# Patient Record
Sex: FEMALE | Race: WHITE | URBAN - METROPOLITAN AREA
[De-identification: names, ages, dates, MRNs, and addresses within clinical notes are randomized per-mention and may not be internally consistent; named-entity substitution may affect disease eponyms.]

---

## 2017-01-13 DIAGNOSIS — E11.65 TYPE 2 DIABETES MELLITUS WITH HYPERGLYCEMIA, WITH LONG-TERM CURRENT USE OF INSULIN (H): Primary | Chronic | ICD-10-CM

## 2017-01-13 DIAGNOSIS — Z79.4 TYPE 2 DIABETES MELLITUS WITH HYPERGLYCEMIA, WITH LONG-TERM CURRENT USE OF INSULIN (H): Primary | Chronic | ICD-10-CM

## 2017-01-16 RX ORDER — CANAGLIFLOZIN 100 MG/1
TABLET, FILM COATED ORAL
Qty: 30 TABLET | Refills: 0 | Status: SHIPPED | OUTPATIENT
Start: 2017-01-16 | End: 2017-02-20

## 2017-01-16 NOTE — TELEPHONE ENCOUNTER
INVOKANA 100 MG tablet         Last Written Prescription Date: 12/12/2016  Last Fill Quantity: 30, # refills: 0  Last Office Visit with [facility], P or [facility] prescribing provider:  12/27/2016        BP Readings from Last 3 Encounters:   12/27/16 116/76   10/22/16 128/73   10/10/16 126/80     MICROL       22   12/27/2016  No results found for this basename: microalbumin  CREATININE   Date Value Ref Range Status   12/27/2016 0.60 0.52 - 1.04 mg/dL Final   ]  GFR ESTIMATE   Date Value Ref Range Status   12/27/2016 >90  Non  GFR Calc   >60 mL/min/1.7m2 Final   09/09/2016 >90  Non  GFR Calc   >60 mL/min/1.7m2 Final   02/17/2016 >90  Non  GFR Calc   >60 mL/min/1.7m2 Final     GFR ESTIMATE IF BLACK   Date Value Ref Range Status   12/27/2016 >90   GFR Calc   >60 mL/min/1.7m2 Final   09/09/2016 >90   GFR Calc   >60 mL/min/1.7m2 Final   02/17/2016 >90   GFR Calc   >60 mL/min/1.7m2 Final     CHOL      152   3/23/2016  HDL       69   3/23/2016  LDL       51   3/23/2016  LDL       72   10/10/2014  TRIG      159   3/23/2016  CHOLHDLRATIO      3.7   4/13/2015  AST        8   12/27/2016  ALT       31   12/27/2016  A1C      7.2   10/22/2016  A1C      7.5   9/9/2016  A1C      7.6   3/23/2016  A1C      8.3   2/22/2016  A1C      8.7   1/22/2016  POTASSIUM   Date Value Ref Range Status   12/27/2016 4.4 3.4 - 5.3 mmol/L Final

## 2017-01-16 NOTE — TELEPHONE ENCOUNTER
Prescription approved per [facility] Refill Protocol.  Haider Camacho RN

## 2017-02-16 DIAGNOSIS — Z79.4 TYPE 2 DIABETES MELLITUS WITH HYPERGLYCEMIA, WITH LONG-TERM CURRENT USE OF INSULIN (H): Chronic | ICD-10-CM

## 2017-02-16 DIAGNOSIS — E11.65 TYPE 2 DIABETES MELLITUS WITH HYPERGLYCEMIA, WITH LONG-TERM CURRENT USE OF INSULIN (H): Chronic | ICD-10-CM

## 2017-02-16 RX ORDER — GLIPIZIDE 10 MG/1
TABLET ORAL
Qty: 180 TABLET | Refills: 0 | Status: CANCELLED | OUTPATIENT
Start: 2017-02-16

## 2017-02-17 NOTE — TELEPHONE ENCOUNTER
Glipizide          Last Written Prescription Date: 11/01/2016  Last Fill Quantity: 180, # refills: 0  Last Office Visit with G, P or [facility] prescribing provider:  12/27/2016        BP Readings from Last 3 Encounters:   12/27/16 116/76   10/22/16 128/73   10/10/16 126/80     Lab Results   Component Value Date    MICROL 22 12/27/2016     No results found for: MICROALBUMIN  Creatinine   Date Value Ref Range Status   12/27/2016 0.60 0.52 - 1.04 mg/dL Final   ]  GFR Estimate   Date Value Ref Range Status   12/27/2016 >90  Non  GFR Calc   >60 mL/min/1.7m2 Final   09/09/2016 >90  Non  GFR Calc   >60 mL/min/1.7m2 Final   02/17/2016 >90  Non  GFR Calc   >60 mL/min/1.7m2 Final     GFR Estimate If Black   Date Value Ref Range Status   12/27/2016 >90   GFR Calc   >60 mL/min/1.7m2 Final   09/09/2016 >90   GFR Calc   >60 mL/min/1.7m2 Final   02/17/2016 >90   GFR Calc   >60 mL/min/1.7m2 Final     Lab Results   Component Value Date    CHOL 152 03/23/2016     Lab Results   Component Value Date    HDL 69 03/23/2016     Lab Results   Component Value Date    LDL 51 03/23/2016    LDL 72 10/10/2014     Lab Results   Component Value Date    TRIG 159 03/23/2016     Lab Results   Component Value Date    CHOLHDLRATIO 3.7 04/13/2015     Lab Results   Component Value Date    AST 8 12/27/2016     Lab Results   Component Value Date    ALT 31 12/27/2016     Lab Results   Component Value Date    A1C 7.2 10/22/2016    A1C 7.5 09/09/2016    A1C 7.6 03/23/2016    A1C 8.3 02/22/2016    A1C 8.7 01/22/2016     Potassium   Date Value Ref Range Status   12/27/2016 4.4 3.4 - 5.3 mmol/L Final

## 2017-02-20 ENCOUNTER — OFFICE VISIT (OUTPATIENT)
Dept: FAMILY MEDICINE | Facility: CLINIC | Age: 52
End: 2017-02-20
Payer: MEDICARE

## 2017-02-20 VITALS
TEMPERATURE: 98.4 F | DIASTOLIC BLOOD PRESSURE: 68 MMHG | BODY MASS INDEX: 29.39 KG/M2 | SYSTOLIC BLOOD PRESSURE: 110 MMHG | HEART RATE: 93 BPM | OXYGEN SATURATION: 98 % | WEIGHT: 176.4 LBS | HEIGHT: 65 IN

## 2017-02-20 DIAGNOSIS — E11.65 TYPE 2 DIABETES MELLITUS WITH HYPERGLYCEMIA, WITH LONG-TERM CURRENT USE OF INSULIN (H): Primary | Chronic | ICD-10-CM

## 2017-02-20 DIAGNOSIS — I10 ESSENTIAL HYPERTENSION, MALIGNANT: ICD-10-CM

## 2017-02-20 DIAGNOSIS — Z23 NEED FOR PROPHYLACTIC VACCINATION AND INOCULATION AGAINST INFLUENZA: ICD-10-CM

## 2017-02-20 DIAGNOSIS — Z79.4 TYPE 2 DIABETES MELLITUS WITH HYPERGLYCEMIA, WITH LONG-TERM CURRENT USE OF INSULIN (H): Primary | Chronic | ICD-10-CM

## 2017-02-20 DIAGNOSIS — Z13.89 SCREENING FOR DIABETIC PERIPHERAL NEUROPATHY: ICD-10-CM

## 2017-02-20 LAB — HBA1C MFR BLD: 7.9 % (ref 4.3–6)

## 2017-02-20 PROCEDURE — 36415 COLL VENOUS BLD VENIPUNCTURE: CPT | Performed by: FAMILY MEDICINE

## 2017-02-20 PROCEDURE — 83036 HEMOGLOBIN GLYCOSYLATED A1C: CPT | Performed by: FAMILY MEDICINE

## 2017-02-20 PROCEDURE — 99207 C FOOT EXAM  NO CHARGE: CPT | Performed by: FAMILY MEDICINE

## 2017-02-20 PROCEDURE — 99213 OFFICE O/P EST LOW 20 MIN: CPT | Performed by: FAMILY MEDICINE

## 2017-02-20 RX ORDER — LISINOPRIL 2.5 MG/1
2.5 TABLET ORAL DAILY
Qty: 90 TABLET | Refills: 2 | Status: SHIPPED | OUTPATIENT
Start: 2017-02-20 | End: 2017-03-28

## 2017-02-20 RX ORDER — GLIPIZIDE 10 MG/1
TABLET ORAL
Qty: 180 TABLET | Refills: 0 | Status: SHIPPED | OUTPATIENT
Start: 2017-02-20 | End: 2017-03-28

## 2017-02-20 ASSESSMENT — PAIN SCALES - GENERAL: PAINLEVEL: MILD PAIN (2)

## 2017-02-20 NOTE — NURSING NOTE
"Chief Complaint   Patient presents with     Diabetes       Initial /68  Pulse 93  Temp 98.4  F (36.9  C) (Oral)  Ht 5' 5.3\" (1.659 m)  Wt 176 lb 6.4 oz (80 kg)  SpO2 98%  BMI 29.09 kg/m2 Estimated body mass index is 29.09 kg/(m^2) as calculated from the following:    Height as of this encounter: 5' 5.3\" (1.659 m).    Weight as of this encounter: 176 lb 6.4 oz (80 kg).  Medication Reconciliation: complete     Nimco Brand, BARON   "

## 2017-02-20 NOTE — PROGRESS NOTES
"  SUBJECTIVE:                                                    Kimberly Bills is a 52 year old female who presents to clinic today for the following health issues:    Diabetes Follow-up    Patient is checking blood sugars: twice daily.    Blood sugar testing frequency justification: Patient modifying lifestyle changes (diet, exercise) with blood sugars  Results are as follows:         158-94    Diabetic concerns: None     Symptoms of hypoglycemia (low blood sugar): shaky, blurred vision     Paresthesias (numbness or burning in feet) or sores: No     Date of last diabetic eye exam: 1/2017     Taking insulin: 38 Lantus and 4 units of Novolog with meals.  Also taking Januvia 100 mg and Glipizide 10 mg twice daily   Been missing some injections a lot of times.  Has some dryness and no frequency.    Weight   Working out; walking more.  Wt Readings from Last 4 Encounters:   02/20/17 176 lb 6.4 oz (80 kg)   12/27/16 175 lb (79.4 kg)   10/22/16 190 lb 6.4 oz (86.4 kg)   10/10/16 191 lb 6.4 oz (86.8 kg)         Amount of exercise or physical activity: physically active at work    Problems taking medications regularly: No    Medication side effects: none  Diet: diabetic and carbohydrate counting    Problem list and histories reviewed & adjusted, as indicated.  Additional history: as documented    Problem list, Medication list, Allergies, and Medical/Social/Surgical histories [facility] in [facility] and updated as appropriate.    ROS:  Constitutional, HEENT, cardiovascular, pulmonary, gi and gu systems are negative, except as otherwise noted.    OBJECTIVE:                                                    /68  Pulse 93  Temp 98.4  F (36.9  C) (Oral)  Ht 5' 5.3\" (1.659 m)  Wt 176 lb 6.4 oz (80 kg)  SpO2 98%  BMI 29.09 kg/m2  Body mass index is 29.09 kg/(m^2).  GENERAL: healthy, alert and no distress  NECK: no adenopathy and thyroid normal to palpation  RESP: lungs clear to auscultation - no rales, rhonchi or wheezes  CV: " regular rate and rhythm, no murmur, click or rub, no peripheral edema   ABDOMEN: soft, nontender, no masses and bowel sounds normal  MS: no gross musculoskeletal defects noted, no edema    Diagnostic Test Results:     ASSESSMENT/PLAN:                                                    (E11.65,  Z79.4) Type 2 diabetes mellitus with hyperglycemia, with long-term current use of insulin (H)  (primary encounter diagnosis)  Comment: A1c at goal but trending up. She has been non compliant with insulin. Has lost some weight. Discussed regular use of medications.  Plan: Hemoglobin A1c, glipiZIDE (GLUCOTROL) 10 MG         tablet, canagliflozin (INVOKANA) 100 MG tablet         lisinopril (PRINIVIL/ZESTRIL) 2.5 MG tablet    (Z13.89) Screening for diabetic peripheral neuropathy  Comment: Normal foot exam. Foot care discussed.  Plan: FOOT EXAM  NO CHARGE [56447.114]         Follow up in 3 months or sooner with concerns    Jeb Ruiz MD  [facility]

## 2017-02-20 NOTE — PATIENT INSTRUCTIONS
[facility]    If you have any questions regarding to your visit please contact your care team:       Team Purple:   Clinic Hours Telephone Number   [address] Dr[address]   7am-7pm  Monday - Thursday   7am-5pm  Fridays  [phone]  (Appointment scheduling available 24/7)    Questions about your Visit?   Team Line:  [phone]   Urgent Care - [place] and [facility] - 11am-9pm Monday-Friday Saturday-Sunday- 9am-5pm   [place] - 5pm-9pm Monday-Friday Saturday-Sunday- 9am-5pm  [phone] - [facility]  [phone] - [place]       What options do I have for visits at the clinic other than the traditional office visit?  To expand how we care for you, many of our providers are utilizing electronic visits (e-visits) and telephone visits, when medically appropriate, for interactions with their patients rather than a visit in the clinic.   We also offer nurse visits for many medical concerns. Just like any other service, we will bill your insurance company for this type of visit based on time spent on the phone with your provider. Not all insurance companies cover these visits. Please check with your medical insurance if this type of visit is covered. You will be responsible for any charges that are not paid by your insurance.      E-visits via [employer]Socialt:  generally incur a $35.00 fee.  Telephone visits:  Time spent on the phone: *charged based on time that is spent on the phone in increments of 10 minutes. Estimated cost:   5-10 mins $30.00   11-20 mins. $59.00   21-30 mins. $85.00     Use [email or web address]Socialt (secure email communication and access to your chart) to send your primary care provider a message or make an appointment. Ask someone on your Team how to sign up for [employer].  For a Price Quote for your services, please call our Consumer Price Line at [phone].  As always, Thank you for trusting us with your health care  needs!    Discharged By: An

## 2017-02-20 NOTE — MR AVS SNAPSHOT
After Visit Summary   2/20/2017    Kimberly Bills    MRN: [id number]           Patient Information     Date Of Birth          1965        Visit Information        Provider Department      2/20/2017 3:00 PM Jeb Ruiz MD [facility]        Today's Diagnoses     Type 2 diabetes mellitus with hyperglycemia, with long-term current use of insulin (H)    -  1    Screening for diabetic peripheral neuropathy        Need for prophylactic vaccination and inoculation against influenza        Essential hypertension, malignant          Care Instructions    [facility]    If you have any questions regarding to your visit please contact your care team:       Team Purple:   Clinic Hours Telephone Number   [address] Dr.   7am-7pm  Monday - Thursday   7am-5pm  Fridays  [phone]  (Appointment scheduling available 24/7)    Questions about your Visit?   Team Line:  [phone]   Urgent Care - [place] and [facility] - 11am-9pm Monday-Friday Saturday-Sunday- 9am-5pm   [place] - 5pm-9pm Monday-Friday Saturday-Sunday- 9am-5pm  [phone] - [place]   [phone] - [place]       What options do I have for visits at the clinic other than the traditional office visit?  To expand how we care for you, many of our providers are utilizing electronic visits (e-visits) and telephone visits, when medically appropriate, for interactions with their patients rather than a visit in the clinic.   We also offer nurse visits for many medical concerns. Just like any other service, we will bill your insurance company for this type of visit based on time spent on the phone with your provider. Not all insurance companies cover these visits. Please check with your medical insurance if this type of visit is covered. You will be responsible for any charges that are not paid by your insurance.      E-visits via  [employer]:  generally incur a $35.00 fee.  Telephone visits:  Time spent on the phone: *charged based on time that is spent on the phone in increments of 10 minutes. Estimated cost:   5-10 mins $30.00   11-20 mins. $59.00   21-30 mins. $85.00     Use [employer] (secure email communication and access to your chart) to send your primary care provider a message or make an appointment. Ask someone on your Team how to sign up for [employer].  For a Price Quote for your services, please call our Conmio Line at [phone].  As always, Thank you for trusting us with your health care needs!    Discharged By: An          Follow-ups after your visit        Follow-up notes from your care team     Return in about 3 months (around 5/20/2017).      Who to contact     If you have questions or need follow up information about today's clinic visit or your schedule please contact [facility] directly at [phone].  Normal or non-critical lab and imaging results will be communicated to you by MyChart, letter or phone within 4 business days after the clinic has received the results. If you do not hear from us within 7 days, please contact the clinic through [employer] Engine[employer] or phone. If you have a critical or abnormal lab result, we will notify you by phone as soon as possible.  Submit refill requests through [employer] or call your pharmacy and they will forward the refill request to us. Please allow 3 business days for your refill to be completed.          Additional Information About Your Visit        Tour [employer] Information     [employer] gives you secure access to your electronic health record. If you see a primary care provider, you can also send messages to your care team and make appointments. If you have questions, please call your primary care clinic.  If you do not have a primary care provider, please call [phone] and they will assist you.        Care EveryWhere ID     This is your Care EveryWhere ID. This could be used  "by other organizations to access your [facility] medical records  [id number]        Your Vitals Were     Pulse Temperature Height Pulse Oximetry BMI (Body Mass Index)       93 98.4  F (36.9  C) (Oral) 5' 5.3\" (1.659 m) 98% 29.09 kg/m2        Blood Pressure from Last 3 Encounters:   02/20/17 110/68   12/27/16 116/76   10/22/16 128/73    Weight from Last 3 Encounters:   02/20/17 176 lb 6.4 oz (80 kg)   12/27/16 175 lb (79.4 kg)   10/22/16 190 lb 6.4 oz (86.4 kg)              We Performed the Following     FOOT EXAM  NO CHARGE [01767.114]     Hemoglobin A1c          Today's Medication Changes          These changes are accurate as of: 2/20/17  3:50 PM.  If you have any questions, ask your nurse or doctor.               These medicines have changed or have updated prescriptions.        Dose/Directions    canagliflozin 100 MG tablet   Commonly known as:  INVOKANA   This may have changed:  See the new instructions.   Used for:  Type 2 diabetes mellitus with hyperglycemia, with long-term current use of insulin (H)   Changed by:  Jeb Ruiz MD        Dose:  100 mg   Take 1 tablet (100 mg) by mouth every morning (before breakfast)   Quantity:  90 tablet   Refills:  1       fluticasone-salmeterol 45-21 MCG/ACT inhaler   Commonly known as:  ADVAIR-HFA   This may have changed:  additional instructions   Used for:  Wheezing        Dose:  2 puff   Inhale 2 puffs into the lungs 2 times daily   Quantity:  12 g   Refills:  1       glipiZIDE 10 MG tablet   Commonly known as:  GLUCOTROL   This may have changed:  See the new instructions.   Used for:  Type 2 diabetes mellitus with hyperglycemia, with long-term current use of insulin (H)   Changed by:  Jeb Ruiz MD        TAKE 1 TABLET(10 MG) BY MOUTH TWICE DAILY BEFORE MEALS   Quantity:  180 tablet   Refills:  0            Where to get your medicines      These medications were sent to [facility] [address] [place] [address] NE AT SEC OF " [address]  [address] [place], [place] [address] [id number]    Hours:  Test fax successful 12/11/02   Phone:  [phone]     canagliflozin 100 MG tablet    glipiZIDE 10 MG tablet    lisinopril 2.5 MG tablet                Primary Care Provider Office Phone # Fax #    Jeb Ruiz -[phone]       [address]  [place] [address]        Thank you!     Thank you for choosing [facility]  for your care. Our goal is always to provide you with excellent care. Hearing back from our patients is one way we can continue to improve our services. Please take a few minutes to complete the written survey that you may receive in the mail after your visit with us. Thank you!             Your Updated Medication List - Protect others around you: Learn how to safely use, store and throw away your medicines at www.disposemymeds.org.          This list is accurate as of: 2/20/17  3:51 PM.  Always use your most recent med list.                   Brand Name Dispense Instructions for use    albuterol 108 (90 BASE) MCG/ACT Inhaler    PROAIR HFA/PROVENTIL HFA/VENTOLIN HFA    1 Inhaler    Inhale 2 puffs into the lungs every 6 hours as needed for shortness of breath / dyspnea or wheezing       ASPIRIN LOW DOSE 81 MG EC tablet   Generic drug:  aspirin     90 tablet    TAKE ONE TABLET BY MOUTH DAILY       blood glucose monitoring meter device kit     1 kit    Use to test blood sugars  daily as directed.       blood glucose monitoring test strip    no brand specified    3 Month    Test in the am       canagliflozin 100 MG tablet    INVOKANA    90 tablet    Take 1 tablet (100 mg) by mouth every morning (before breakfast)       cyclobenzaprine 10 MG tablet    FLEXERIL    90 tablet    TAKE 1 TABLET(10 MG) BY MOUTH TWICE DAILY AS NEEDED FOR MUSCLE SPASMS       DAILY-RIA Tabs     90 tablet    TAKE ONE TABLET BY MOUTH DAILY       diphenhydrAMINE 25 MG capsule    BENADRYL    30  capsule    Take 1 capsule (25 mg) by mouth every 6 hours as needed for itching or allergies       divalproex 500 MG EC tablet    DEPAKOTE     Take  by mouth. 3 tab daily       fluticasone-salmeterol 45-21 MCG/ACT inhaler    ADVAIR-HFA    12 g    Inhale 2 puffs into the lungs 2 times daily       glipiZIDE 10 MG tablet    GLUCOTROL    180 tablet    TAKE 1 TABLET(10 MG) BY MOUTH TWICE DAILY BEFORE MEALS       hydrocortisone 2.5 % ointment     60 g    Apply topically 2 times daily       insulin glargine 100 UNIT/ML injection    LANTUS SOLOSTAR    15 mL    INJECT 38 UNITS AT BEDTIME       insulin pen needle 31G X 5 MM    B-D U/F    100 each    USE ONE needle 4 times DAILY       Zenring FINEPOINT LANCETS Misc     100 each    Use to test blood sugars daily  as directed.       lisinopril 2.5 MG tablet    PRINIVIL/Zestril    90 tablet    Take 1 tablet (2.5 mg) by mouth daily       neomycin-polymyxin-hydrocortisone 3.5-71151-5 otic suspension    CORTISPORIN    10 mL    Place 4 drops in ear(s) 4 times daily       NovoLOG FLEXPEN 100 UNIT/ML injection   Generic drug:  insulin aspart     15 mL    INJECT SUBCUTANEOUS 4 UNITS BEFORE BREAKFAST, LUNCH AND DINNER       omeprazole 20 MG CR capsule    priLOSEC    90 capsule    TAKE ONE CAPSULE BY MOUTH DAILY 30 TO 60 MINUTES BEFORE A MEAL       STATIN NOT PRESCRIBED (INTENTIONAL)     0 each    continuous prn. Statin not prescribed intentionally due to Other:LDL at goal       venlafaxine 150 MG Tb24 24 hr tablet    EFFEXOR-ER     300 mg at HS along with 75 mg tab at HS (total 375 mg at HS)

## 2017-02-28 ENCOUNTER — TELEPHONE (OUTPATIENT)
Dept: FAMILY MEDICINE | Facility: CLINIC | Age: 52
End: 2017-02-28

## 2017-02-28 NOTE — TELEPHONE ENCOUNTER
Received a fax from new pharmacy called [facility]. They are requesting orders for lidocaine 5% ointment for the patient.  Patient is not taking this medications.  I spoke with patient and advised on the information as above. She states she does not use this pharmacy and is not requesting lidocaine ointment.   Please disregard this request and be advised that this patient is not using [place] pharmacy for her prescriptions.   Nimco Brand, CMA

## 2017-03-11 DIAGNOSIS — E11.65 TYPE 2 DIABETES MELLITUS WITH HYPERGLYCEMIA, WITH LONG-TERM CURRENT USE OF INSULIN (H): Chronic | ICD-10-CM

## 2017-03-11 DIAGNOSIS — Z79.4 TYPE 2 DIABETES MELLITUS WITH HYPERGLYCEMIA, WITH LONG-TERM CURRENT USE OF INSULIN (H): Chronic | ICD-10-CM

## 2017-03-13 NOTE — TELEPHONE ENCOUNTER
canagliflozin (INVOKANA) 100 MG tablet       Last Written Prescription Date: 2/20/17  Last Fill Quantity: 90, # refills: 1  Last Office Visit with G, P or [facility] prescribing provider:  2/20/17       BP Readings from Last 3 Encounters:   02/20/17 110/68   12/27/16 116/76   10/22/16 128/73     Lab Results   Component Value Date    MICROL 22 12/27/2016     No results found for: MICROALBUMIN  Creatinine   Date Value Ref Range Status   12/27/2016 0.60 0.52 - 1.04 mg/dL Final   ]  GFR Estimate   Date Value Ref Range Status   12/27/2016 >90  Non  GFR Calc   >60 mL/min/1.7m2 Final   09/09/2016 >90  Non  GFR Calc   >60 mL/min/1.7m2 Final   02/17/2016 >90  Non  GFR Calc   >60 mL/min/1.7m2 Final     GFR Estimate If Black   Date Value Ref Range Status   12/27/2016 >90   GFR Calc   >60 mL/min/1.7m2 Final   09/09/2016 >90   GFR Calc   >60 mL/min/1.7m2 Final   02/17/2016 >90   GFR Calc   >60 mL/min/1.7m2 Final     Lab Results   Component Value Date    CHOL 152 03/23/2016     Lab Results   Component Value Date    HDL 69 03/23/2016     Lab Results   Component Value Date    LDL 51 03/23/2016    LDL 72 10/10/2014     Lab Results   Component Value Date    TRIG 159 03/23/2016     Lab Results   Component Value Date    CHOLHDLRATIO 3.7 04/13/2015     Lab Results   Component Value Date    AST 8 12/27/2016     Lab Results   Component Value Date    ALT 31 12/27/2016     Lab Results   Component Value Date    A1C 7.9 02/20/2017    A1C 7.2 10/22/2016    A1C 7.5 09/09/2016    A1C 7.6 03/23/2016    A1C 8.3 02/22/2016     Potassium   Date Value Ref Range Status   12/27/2016 4.4 3.4 - 5.3 mmol/L Final

## 2017-03-14 RX ORDER — CANAGLIFLOZIN 100 MG/1
TABLET, FILM COATED ORAL
Qty: 30 TABLET | Refills: 0 | OUTPATIENT
Start: 2017-03-14

## 2017-03-14 NOTE — TELEPHONE ENCOUNTER
Invokana  Filled for 90 tabs with 1 refill 2/20/17, too soon.  Refill refused.    Christian Ackerman RN, BSN

## 2017-03-28 ENCOUNTER — OFFICE VISIT (OUTPATIENT)
Dept: FAMILY MEDICINE | Facility: CLINIC | Age: 52
End: 2017-03-28
Payer: MEDICARE

## 2017-03-28 VITALS
BODY MASS INDEX: 33.79 KG/M2 | HEART RATE: 101 BPM | HEIGHT: 61 IN | SYSTOLIC BLOOD PRESSURE: 118 MMHG | TEMPERATURE: 99.3 F | OXYGEN SATURATION: 97 % | WEIGHT: 179 LBS | DIASTOLIC BLOOD PRESSURE: 70 MMHG

## 2017-03-28 DIAGNOSIS — F17.200 NEEDS SMOKING CESSATION EDUCATION: ICD-10-CM

## 2017-03-28 DIAGNOSIS — Z79.4 TYPE 2 DIABETES MELLITUS WITH HYPERGLYCEMIA, WITH LONG-TERM CURRENT USE OF INSULIN (H): Primary | Chronic | ICD-10-CM

## 2017-03-28 DIAGNOSIS — E11.65 TYPE 2 DIABETES MELLITUS WITH HYPERGLYCEMIA, WITH LONG-TERM CURRENT USE OF INSULIN (H): Chronic | ICD-10-CM

## 2017-03-28 DIAGNOSIS — Z79.4 TYPE 2 DIABETES MELLITUS WITH HYPERGLYCEMIA, WITH LONG-TERM CURRENT USE OF INSULIN (H): Chronic | ICD-10-CM

## 2017-03-28 DIAGNOSIS — I10 ESSENTIAL HYPERTENSION, MALIGNANT: ICD-10-CM

## 2017-03-28 DIAGNOSIS — E11.65 TYPE 2 DIABETES MELLITUS WITH HYPERGLYCEMIA, WITH LONG-TERM CURRENT USE OF INSULIN (H): Primary | Chronic | ICD-10-CM

## 2017-03-28 PROCEDURE — 99214 OFFICE O/P EST MOD 30 MIN: CPT | Performed by: FAMILY MEDICINE

## 2017-03-28 RX ORDER — GLIPIZIDE 10 MG/1
TABLET ORAL
Qty: 60 TABLET | Refills: 5 | Status: SHIPPED | OUTPATIENT
Start: 2017-03-28 | End: 2017-03-28

## 2017-03-28 RX ORDER — LISINOPRIL 2.5 MG/1
2.5 TABLET ORAL DAILY
Qty: 30 TABLET | Refills: 5 | Status: SHIPPED | OUTPATIENT
Start: 2017-03-28

## 2017-03-28 ASSESSMENT — PAIN SCALES - GENERAL: PAINLEVEL: NO PAIN (0)

## 2017-03-28 NOTE — PROGRESS NOTES
SUBJECTIVE:                                                    Kimberly Bills is a 52 year old female who presents to clinic today for the following health issues:    Medication Followup of  ALL MEDS   Patient transferring care to [facility] and would like her medications refilled.      Taking Medication as prescribed: yes    Side Effects:  None    Medication Helping Symptoms:  yes     Diabetes Follow-up      Patient is checking blood sugars: rarely.  Results range from 120 to 150    Diabetic concerns: None     Symptoms of hypoglycemia (low blood sugar): none     Paresthesias (numbness or burning in feet) or sores: No     Date of last diabetic eye exam: within the last     Patient Active Problem List   Diagnosis     Borderline personality disorder     Bipolar disorder (H)     PTSD (post-traumatic stress disorder)     Chronic low back pain     GERD (gastroesophageal reflux disease)     Type 2 diabetes, HbA1C goal < 7%     Obesity     HDL deficiency     Sciatica     Subclinical hypothyroidism     Hyperlipidemia with target LDL less than 100     Hypertriglyceridemia     Cerebral seizure     Hypertension     Lumbago     Cervicalgia     Past Surgical History:   Procedure Laterality Date     APPENDECTOMY  1981     ENT SURGERY      tonsilectomy     HYSTERECTOMY, PAP NO LONGER INDICATED      for menorrhagia     ORTHOPEDIC SURGERY Left     carpal tunnel and trigger finger     RELEASE TRIGGER FINGER  5/2/2014    Procedure: RELEASE TRIGGER FINGER;  Surgeon: Tanvi Gibson MD;  Location:  OR       Social History   Substance Use Topics     Smoking status: Current Every Day Smoker     Packs/day: 0.75     Types: Cigarettes     Last attempt to quit: 7/15/2013     Smokeless tobacco: Never Used      Comment: 3/4 to 1 pack daily     Alcohol use No     Family History   Problem Relation Age of Onset     Cancer - colorectal Mother 60     DIABETES Father      HEART DISEASE Father      DIABETES Brother      DIABETES Brother       "      Reviewed and updated as needed this visit by clinical staff  Tobacco  Allergies  Meds  Med Hx  Surg Hx  Fam Hx  Soc Hx      Reviewed and updated as needed this visit by Provider         ROS:  Constitutional, HEENT, cardiovascular, pulmonary, gi and gu systems are negative, except as otherwise noted.    OBJECTIVE:                                                    /70  Pulse 101  Temp 99.3  F (37.4  C) (Oral)  Ht 5' 0.53\" (1.537 m)  Wt 179 lb (81.2 kg)  SpO2 97%  BMI 34.35 kg/m2  Body mass index is 34.35 kg/(m^2).  GENERAL: healthy, alert and no distress  NECK: no adenopathy, no asymmetry, masses, or scars and thyroid normal to palpation  RESP: lungs clear to auscultation - no rales, rhonchi or wheezes  CV: regular rate and rhythm, no murmur, click or rub, no peripheral edema and peripheral pulses strong  ABDOMEN: soft, nontender, no hepatosplenomegaly, no masses and bowel sounds normal  MS: no gross musculoskeletal defects noted, no edema    Diagnostic Test Results:  none      ASSESSMENT/PLAN:                                                    (E11.65,  Z79.4) Type 2 diabetes mellitus with hyperglycemia, with long-term current use of insulin (H)  (primary encounter diagnosis)  Comment: A1c at goal and tolerating medications well. Invokana working well. Reviewed current prescription and did refill due medications. Also discussed eye exam she believes has had one in the last year though not records on file. Will schedule in [facility]. She will   Plan: glipiZIDE (GLUCOTROL) 10 MG tablet,         canagliflozin (INVOKANA) 100 MG tablet    (I10) Essential hypertension, malignant  Comment: BP at goal.   Plan: lisinopril (PRINIVIL/ZESTRIL) 2.5 MG tablet    (F17.200) Needs smoking cessation education  Plan: NOVU Referral Smoking Cessation      Jeb Ruiz MD  [facility]    "

## 2017-03-28 NOTE — PATIENT INSTRUCTIONS
[facility]    If you have any questions regarding to your visit please contact your care team:       Team Purple:   Clinic Hours Telephone Number   CHARISMA [address] Dr.   7am-7pm  Monday - Thursday   7am-5pm  Fridays  [phone]  (Appointment scheduling available 24/7)    Questions about your Visit?   Team Line:  [phone]   Urgent Care - [place] and [facility] - 11am-9pm Monday-Friday Saturday-Sunday- 9am-5pm   [place] - 5pm-9pm Monday-Friday Saturday-Sunday- 9am-5pm  [phone] - [facility]  [phone] - [place]       What options do I have for visits at the clinic other than the traditional office visit?  To expand how we care for you, many of our providers are utilizing electronic visits (e-visits) and telephone visits, when medically appropriate, for interactions with their patients rather than a visit in the clinic.   We also offer nurse visits for many medical concerns. Just like any other service, we will bill your insurance company for this type of visit based on time spent on the phone with your provider. Not all insurance companies cover these visits. Please check with your medical insurance if this type of visit is covered. You will be responsible for any charges that are not paid by your insurance.      E-visits via [employer]:  generally incur a $35.00 fee.  Telephone visits:  Time spent on the phone: *charged based on time that is spent on the phone in increments of 10 minutes. Estimated cost:   5-10 mins $30.00   11-20 mins. $59.00   21-30 mins. $85.00     Use [employer] (secure email communication and access to your chart) to send your primary care provider a message or make an appointment. Ask someone on your Team how to sign up for RobArt.  For a Price Quote for your services, please call our Consumer Price Line at [phone].  As always, Thank you for trusting us with your health care  needs!    Discharged by Nimco Brand, CMA

## 2017-03-28 NOTE — MR AVS SNAPSHOT
After Visit Summary   3/28/2017    Kimberly Bills    MRN: [id number]           Patient Information     Date Of Birth          1965        Visit Information        Provider Department      3/28/2017 4:40 PM Jeb Ruiz MD [facility]        Today's Diagnoses     Type 2 diabetes mellitus with hyperglycemia, with long-term current use of insulin (H)    -  1    Essential hypertension, malignant        Needs smoking cessation education          Care Instructions    [facility]    If you have any questions regarding to your visit please contact your care team:       Team Purple:   Clinic Hours Telephone Number   CHARISMA [address] Dr.   7am-7pm  Monday - Thursday   7am-5pm  Fridays  [phone]  (Appointment scheduling available 24/7)    Questions about your Visit?   Team Line:  [phone]   [facility] [place] [facility] 11am-9pm Monday-Friday Saturday-Sunday- 9am-5pm   [place] - 5pm-9pm Monday-Friday Saturday-Sunday- 9am-5pm  [phone] - [place]   [phone] - [place]       What options do I have for visits at the clinic other than the traditional office visit?  To expand how we care for you, many of our providers are utilizing electronic visits (e-visits) and telephone visits, when medically appropriate, for interactions with their patients rather than a visit in the clinic.   We also offer nurse visits for many medical concerns. Just like any other service, we will bill your insurance company for this type of visit based on time spent on the phone with your provider. Not all insurance companies cover these visits. Please check with your medical insurance if this type of visit is covered. You will be responsible for any charges that are not paid by your insurance.      E-visits via [employer]:  generally incur a $35.00 fee.  Telephone visits:  Time spent on the phone:  *charged based on time that is spent on the phone in increments of 10 minutes. Estimated cost:   5-10 mins $30.00   11-20 mins. $59.00   21-30 mins. $85.00     Use [employer] (secure email communication and access to your chart) to send your primary care provider a message or make an appointment. Ask someone on your Team how to sign up for [employer].  For a Price Quote for your services, please call our DailyBurn Line at [phone].  As always, Thank you for trusting us with your health care needs!    Discharged by Nimco Brand CMA          Follow-ups after your visit        Additional Services     NOV Referral Smoking Cessation       Albright online at [email or web address]                  Who to contact     If you have questions or need follow up information about today's clinic visit or your schedule please contact [facility] directly at [phone].  Normal or non-critical lab and imaging results will be communicated to you by MyChart, letter or phone within 4 business days after the clinic has received the results. If you do not hear from us within 7 days, please contact the clinic through [employer] or phone. If you have a critical or abnormal lab result, we will notify you by phone as soon as possible.  Submit refill requests through [email or web address] or call your pharmacy and they will forward the refill request to us. Please allow 3 business days for your refill to be completed.          Additional Information About Your Visit        thePlatformhart Information     [employer] gives you secure access to your electronic health record. If you see a primary care provider, you can also send messages to your care team and make appointments. If you have questions, please call your primary care clinic.  If you do not have a primary care provider, please call [phone] and they will assist you.        Care EveryWhere ID     This is your Care EveryWhere ID. This could be used by other organizations to access  "your [place] medical records  [id number]        Your Vitals Were     Pulse Temperature Height Pulse Oximetry BMI (Body Mass Index)       101 99.3  F (37.4  C) (Oral) 5' 0.53\" (1.537 m) 97% 34.35 kg/m2        Blood Pressure from Last 3 Encounters:   03/28/17 118/70   02/20/17 110/68   12/27/16 116/76    Weight from Last 3 Encounters:   03/28/17 179 lb (81.2 kg)   02/20/17 176 lb 6.4 oz (80 kg)   12/27/16 175 lb (79.4 kg)              We Performed the Following     NOVU Referral Smoking Cessation          Today's Medication Changes          These changes are accurate as of: 3/28/17  5:06 PM.  If you have any questions, ask your nurse or doctor.               These medicines have changed or have updated prescriptions.        Dose/Directions    fluticasone-salmeterol 45-21 MCG/ACT inhaler   Commonly known as:  ADVAIR-HFA   This may have changed:  additional instructions   Used for:  Wheezing        Dose:  2 puff   Inhale 2 puffs into the lungs 2 times daily   Quantity:  12 g   Refills:  1            Where to get your medicines      These medications were sent to [facility] [address] [place] [address] [address] [facility] [address] [facility] [address]    Hours:  Test fax successful 12/11/02   Phone:  [phone]     canagliflozin 100 MG tablet    glipiZIDE 10 MG tablet    lisinopril 2.5 MG tablet                Primary Care Provider Office Phone # Fax #    Jeb Ruiz [phone]       [facility] [address] [place] [address]        Thank you!     Thank you for choosing [facility]  for your care. Our goal is always to provide you with excellent care. Hearing back from our patients is one way we can continue to improve our services. Please take a few minutes to complete the written survey that you may receive in the mail after your visit with us. Thank you!             Your Updated Medication List - Protect " others around you: Learn how to safely use, store and throw away your medicines at www.disposemymeds.org.          This list is accurate as of: 3/28/17  5:06 PM.  Always use your most recent med list.                   Brand Name Dispense Instructions for use    albuterol 108 (90 BASE) MCG/ACT Inhaler    PROAIR HFA/PROVENTIL HFA/VENTOLIN HFA    1 Inhaler    Inhale 2 puffs into the lungs every 6 hours as needed for shortness of breath / dyspnea or wheezing       ASPIRIN LOW DOSE 81 MG EC tablet   Generic drug:  aspirin     90 tablet    TAKE ONE TABLET BY MOUTH DAILY       blood glucose monitoring meter device kit     1 kit    Use to test blood sugars  daily as directed.       blood glucose monitoring test strip    no brand specified    3 Month    Test in the am       canagliflozin 100 MG tablet    INVOKANA    30 tablet    Take 1 tablet (100 mg) by mouth every morning (before breakfast)       cyclobenzaprine 10 MG tablet    FLEXERIL    90 tablet    TAKE 1 TABLET(10 MG) BY MOUTH TWICE DAILY AS NEEDED FOR MUSCLE SPASMS       DAILY-RIA Tabs     90 tablet    TAKE ONE TABLET BY MOUTH DAILY       diphenhydrAMINE 25 MG capsule    BENADRYL    30 capsule    Take 1 capsule (25 mg) by mouth every 6 hours as needed for itching or allergies       divalproex 500 MG EC tablet    DEPAKOTE     Take  by mouth. 3 tab daily       fluticasone-salmeterol 45-21 MCG/ACT inhaler    ADVAIR-HFA    12 g    Inhale 2 puffs into the lungs 2 times daily       glipiZIDE 10 MG tablet    GLUCOTROL    60 tablet    TAKE 1 TABLET(10 MG) BY MOUTH TWICE DAILY BEFORE MEALS       hydrocortisone 2.5 % ointment     60 g    Apply topically 2 times daily       insulin glargine 100 UNIT/ML injection    LANTUS SOLOSTAR    15 mL    INJECT 38 UNITS AT BEDTIME       insulin pen needle 31G X 5 MM    B-D U/F    100 each    USE ONE needle 4 times DAILY       DIN Forumsâ„¢ NetworkCAN FINEPOINT LANCETS Misc     100 each    Use to test blood sugars daily  as directed.       lisinopril 2.5  MG tablet    PRINIVIL/Zestril    30 tablet    Take 1 tablet (2.5 mg) by mouth daily       neomycin-polymyxin-hydrocortisone 3.5-71806-1 otic suspension    CORTISPORIN    10 mL    Place 4 drops in ear(s) 4 times daily       NovoLOG FLEXPEN 100 UNIT/ML injection   Generic drug:  insulin aspart     15 mL    INJECT SUBCUTANEOUS 4 UNITS BEFORE BREAKFAST, LUNCH AND DINNER       omeprazole 20 MG CR capsule    priLOSEC    90 capsule    TAKE ONE CAPSULE BY MOUTH DAILY 30 TO 60 MINUTES BEFORE A MEAL       STATIN NOT PRESCRIBED (INTENTIONAL)     0 each    continuous prn. Statin not prescribed intentionally due to Other:LDL at goal       venlafaxine 150 MG Tb24 24 hr tablet    EFFEXOR-ER     300 mg at HS along with 75 mg tab at HS (total 375 mg at HS)

## 2017-03-28 NOTE — NURSING NOTE
"Chief Complaint   Patient presents with     Recheck Medication       Initial /70  Pulse 101  Temp 99.3  F (37.4  C) (Oral)  Ht 5' 0.53\" (1.537 m)  Wt 179 lb (81.2 kg)  SpO2 97%  BMI 34.35 kg/m2 Estimated body mass index is 34.35 kg/(m^2) as calculated from the following:    Height as of this encounter: 5' 0.53\" (1.537 m).    Weight as of this encounter: 179 lb (81.2 kg).  Medication Reconciliation: complete       Berta Morales CMA      "

## 2017-03-29 NOTE — TELEPHONE ENCOUNTER
Patient was in clinic 3/28/2017 and glipizide was prescribed at that visit. Please refuse this medication as a duplicate request. Thank you,  Nimco Brand, CMA

## 2017-03-30 RX ORDER — GLIPIZIDE 10 MG/1
TABLET ORAL
Qty: 180 TABLET | Refills: 1 | Status: SHIPPED | OUTPATIENT
Start: 2017-03-30

## 2017-04-21 DIAGNOSIS — Z79.4 TYPE 2 DIABETES MELLITUS WITH HYPERGLYCEMIA, WITH LONG-TERM CURRENT USE OF INSULIN (H): Chronic | ICD-10-CM

## 2017-04-21 DIAGNOSIS — E11.65 TYPE 2 DIABETES MELLITUS WITH HYPERGLYCEMIA, WITH LONG-TERM CURRENT USE OF INSULIN (H): Chronic | ICD-10-CM

## 2017-04-21 DIAGNOSIS — E11.65 TYPE 2 DIABETES MELLITUS WITH HYPERGLYCEMIA, UNSPECIFIED LONG TERM INSULIN USE STATUS: Primary | ICD-10-CM

## 2017-07-01 DIAGNOSIS — E11.9 TYPE 2 DIABETES, HBA1C GOAL < 7% (H): Chronic | ICD-10-CM

## 2017-07-01 DIAGNOSIS — E11.65 TYPE 2 DIABETES MELLITUS WITH HYPERGLYCEMIA (H): ICD-10-CM

## 2017-07-01 DIAGNOSIS — M62.838 MUSCLE SPASM: ICD-10-CM

## 2017-07-03 NOTE — TELEPHONE ENCOUNTER
cyclobenzaprine (FLEXERIL) 10 MG tablet      Last Written Prescription Date:  2/22/17  Last Fill Quantity: 90,   # refills: 0  Last Office Visit with [facility], [facility] or [facility] prescribing provider: 3/28/17  Future Office visit:       Routing refill request to provider for review/approval because:  Drug not on the [facility], [facility] or [facility] refill protocol or controlled substance      ASPIRIN LOW DOSE 81 MG EC tablet      Last Written Prescription Date: 5/13/17  Last Fill Quantity: 30,  # refills: 3   Last Office Visit with [facility], [facility] or [facility] prescribing provider: 3/28/17

## 2017-07-05 RX ORDER — CYCLOBENZAPRINE HCL 10 MG
TABLET ORAL
Qty: 30 TABLET | Refills: 0 | Status: SHIPPED | OUTPATIENT
Start: 2017-07-05

## 2017-07-05 RX ORDER — ASPIRIN 81 MG
TABLET, DELAYED RELEASE (ENTERIC COATED) ORAL
Qty: 90 TABLET | Refills: 1 | Status: SHIPPED | OUTPATIENT
Start: 2017-07-05

## 2017-07-05 NOTE — TELEPHONE ENCOUNTER
Aspirin Prescription approved per [facility] Refill Protocol.    Routing Flexeril refill request to provider for review/approval because:  Drug not on the [facility] refill protocol       Giuliana Liriano RN - BC

## 2017-07-12 ENCOUNTER — TELEPHONE (OUTPATIENT)
Dept: FAMILY MEDICINE | Facility: CLINIC | Age: 52
End: 2017-07-12

## 2017-07-12 NOTE — TELEPHONE ENCOUNTER
Panel Management Review      Patient has the following on her problem list:     Diabetes    ASA: Passed    Last A1C  Lab Results   Component Value Date    A1C 7.9 02/20/2017    A1C 7.2 10/22/2016    A1C 7.5 09/09/2016    A1C 7.6 03/23/2016    A1C 8.3 02/22/2016     A1C tested: Passed    Last LDL:    Lab Results   Component Value Date    CHOL 152 03/23/2016     Lab Results   Component Value Date    HDL 69 03/23/2016     Lab Results   Component Value Date    LDL 51 03/23/2016     Lab Results   Component Value Date    TRIG 159 03/23/2016     Lab Results   Component Value Date    CHOLHDLRATIO 3.7 04/13/2015     Lab Results   Component Value Date    NHDL 83 03/23/2016       Is the patient on a Statin? NO             Is the patient on Aspirin? YES    Medications     HMG CoA Reductase Inhibitors    STATIN NOT PRESCRIBED, INTENTIONAL,    Salicylates    ASPIRIN LOW DOSE 81 MG EC tablet          Last three blood pressure readings:  BP Readings from Last 3 Encounters:   03/28/17 118/70   02/20/17 110/68   12/27/16 116/76       Date of last diabetes office visit: 03/28/2017     Tobacco History:     History   Smoking Status     Current Every Day Smoker     Packs/day: 0.75     Types: Cigarettes     Last attempt to quit: 7/15/2013   Smokeless Tobacco     Never Used     Comment: 3/4 to 1 pack daily           Composite cancer screening  Chart review shows that this patient is due/due soon for the following None  Summary:    Patient is due/failing the following:   LDL    Action needed:   None, patient transferring care to [place].    Type of outreach:    none,patient transferring care to [place].    Questions for provider review:    None                                                                                                                                    Saira Shields MA       Chart routed to none .

## 2017-08-07 DIAGNOSIS — M62.838 MUSCLE SPASM: ICD-10-CM

## 2017-08-07 NOTE — TELEPHONE ENCOUNTER
cyclobenzaprine (FLEXERIL) 10 MG tablet      Last Written Prescription Date:  7/5/2017  Last Fill Quantity: 30,   # refills: 0  Last Office Visit with [facility], [facility] or [facility] prescribing provider: 3/28/2017  Future Office visit:       Routing refill request to provider for review/approval because:  Drug not on the [facility], [facility] or [facility] refill protocol or controlled substance

## 2017-08-08 ENCOUNTER — TELEPHONE (OUTPATIENT)
Dept: FAMILY MEDICINE | Facility: CLINIC | Age: 52
End: 2017-08-08

## 2017-08-08 DIAGNOSIS — M62.838 MUSCLE SPASM: ICD-10-CM

## 2017-08-08 RX ORDER — CYCLOBENZAPRINE HCL 10 MG
TABLET ORAL
Qty: 30 TABLET | Refills: 0
Start: 2017-08-08

## 2017-08-08 NOTE — TELEPHONE ENCOUNTER
Patient established care in [facility] should be receiving refills from there by this time

## 2017-08-08 NOTE — TELEPHONE ENCOUNTER
Called pharmacy and informed them that patient is re-establishing her healthcare in [place] and that she needs to have her new doctor take over her meds.  Kelli Hernandez CMA ([facility])

## 2017-08-09 DIAGNOSIS — E11.65 TYPE 2 DIABETES MELLITUS WITH HYPERGLYCEMIA (H): Chronic | ICD-10-CM

## 2017-08-09 RX ORDER — INSULIN GLARGINE 100 [IU]/ML
INJECTION, SOLUTION SUBCUTANEOUS
Qty: 15 ML | Refills: 0 | OUTPATIENT
Start: 2017-08-09

## 2017-08-09 NOTE — TELEPHONE ENCOUNTER
insulin glargine (LANTUS SOLOSTAR) 100 UNIT/ML PEN         Last Written Prescription Date: 07/08/2016  Last Fill Quantity: 15 ML, # refills: 3  Last Office Visit with [facility], [facility] or [facility] prescribing provider:  03/28/2017        BP Readings from Last 3 Encounters:   03/28/17 118/70   02/20/17 110/68   12/27/16 116/76     Lab Results   Component Value Date    MICROL 22 12/27/2016     Lab Results   Component Value Date    UMALCR 27.13 12/27/2016     Creatinine   Date Value Ref Range Status   12/27/2016 0.60 0.52 - 1.04 mg/dL Final   ]  GFR Estimate   Date Value Ref Range Status   12/27/2016 >90  Non  GFR Calc   >60 mL/min/1.7m2 Final   09/09/2016 >90  Non  GFR Calc   >60 mL/min/1.7m2 Final   02/17/2016 >90  Non  GFR Calc   >60 mL/min/1.7m2 Final     GFR Estimate If Black   Date Value Ref Range Status   12/27/2016 >90   GFR Calc   >60 mL/min/1.7m2 Final   09/09/2016 >90   GFR Calc   >60 mL/min/1.7m2 Final   02/17/2016 >90   GFR Calc   >60 mL/min/1.7m2 Final     Lab Results   Component Value Date    CHOL 152 03/23/2016     Lab Results   Component Value Date    HDL 69 03/23/2016     Lab Results   Component Value Date    LDL 51 03/23/2016     Lab Results   Component Value Date    TRIG 159 03/23/2016     Lab Results   Component Value Date    CHOLHDLRATIO 3.7 04/13/2015     Lab Results   Component Value Date    AST 8 12/27/2016     Lab Results   Component Value Date    ALT 31 12/27/2016     Lab Results   Component Value Date    A1C 7.9 02/20/2017    A1C 7.2 10/22/2016    A1C 7.5 09/09/2016    A1C 7.6 03/23/2016    A1C 8.3 02/22/2016     Potassium   Date Value Ref Range Status   12/27/2016 4.4 3.4 - 5.3 mmol/L Final     An JESSICA Shields

## 2017-08-09 NOTE — TELEPHONE ENCOUNTER
Routing refill request to provider for review/approval because:  Drug not on the [facility], [facility] or [facility] refill protocol or controlled substance    Haider Camacho RN

## 2017-08-09 NOTE — TELEPHONE ENCOUNTER
cyclobenzaprine (FLEXERIL)     Last Written Prescription Date: 05/07/2017  Last Fill Quantity: 30,  # refills: 0   Last Office Visit with [facility], [facility] or [facility] prescribing provider: 03/28/2017

## 2017-08-16 DIAGNOSIS — I10 ESSENTIAL HYPERTENSION, MALIGNANT: ICD-10-CM

## 2017-08-16 RX ORDER — LISINOPRIL 2.5 MG/1
TABLET ORAL
Qty: 90 TABLET | Refills: 0 | Status: SHIPPED | OUTPATIENT
Start: 2017-08-16 | End: 2017-12-10

## 2017-08-16 NOTE — TELEPHONE ENCOUNTER
lisinopril (PRINIVIL/ZESTRIL) 2.5 MG tablet     Last Written Prescription Date: 03/28/2017  Last Fill Quantity: 30, # refills: 5  Last Office Visit with G, [facility] or [facility] prescribing provider: 03/28/2017       Potassium   Date Value Ref Range Status   12/27/2016 4.4 3.4 - 5.3 mmol/L Final     Creatinine   Date Value Ref Range Status   12/27/2016 0.60 0.52 - 1.04 mg/dL Final     BP Readings from Last 3 Encounters:   03/28/17 118/70   02/20/17 110/68   12/27/16 116/76     Anni Lopez MA

## 2017-08-16 NOTE — TELEPHONE ENCOUNTER
Prescription approved per [facility] Refill Protocol.  Giuliana Liriano, RN - BC

## 2017-08-22 RX ORDER — CYCLOBENZAPRINE HCL 10 MG
TABLET ORAL
Qty: 30 TABLET | Refills: 0 | OUTPATIENT
Start: 2017-08-22

## 2017-08-29 NOTE — TELEPHONE ENCOUNTER
Called patient and left message to return call to clinic regarding where she lives and if she transferred care someplace else.  Kelli Hernandez CMA ([facility])

## 2017-08-31 NOTE — TELEPHONE ENCOUNTER
Called and spoke with pharmacy. Informed pharmacy that patient is no longer seeing Dr. Ruiz and had transfer her care to [facility].    Saira Shields MA

## 2017-09-28 DIAGNOSIS — Z79.4 TYPE 2 DIABETES MELLITUS WITH HYPERGLYCEMIA, WITH LONG-TERM CURRENT USE OF INSULIN (H): Chronic | ICD-10-CM

## 2017-09-28 DIAGNOSIS — E11.65 TYPE 2 DIABETES MELLITUS WITH HYPERGLYCEMIA, WITH LONG-TERM CURRENT USE OF INSULIN (H): Chronic | ICD-10-CM

## 2017-09-28 NOTE — TELEPHONE ENCOUNTER
glipiZIDE (GLUCOTROL) 10 MG tablet         Last Written Prescription Date: 3/30/2017  Last Fill Quantity: 180, # refills: 1  Last Office Visit with [facility], [facility] or [facility] prescribing provider:  3/28/2017        BP Readings from Last 3 Encounters:   03/28/17 118/70   02/20/17 110/68   12/27/16 116/76     Lab Results   Component Value Date    MICROL 22 12/27/2016     Lab Results   Component Value Date    UMALCR 27.13 12/27/2016     Creatinine   Date Value Ref Range Status   12/27/2016 0.60 0.52 - 1.04 mg/dL Final   ]  GFR Estimate   Date Value Ref Range Status   12/27/2016 >90  Non  GFR Calc   >60 mL/min/1.7m2 Final   09/09/2016 >90  Non  GFR Calc   >60 mL/min/1.7m2 Final   02/17/2016 >90  Non  GFR Calc   >60 mL/min/1.7m2 Final     GFR Estimate If Black   Date Value Ref Range Status   12/27/2016 >90   GFR Calc   >60 mL/min/1.7m2 Final   09/09/2016 >90   GFR Calc   >60 mL/min/1.7m2 Final   02/17/2016 >90   GFR Calc   >60 mL/min/1.7m2 Final     Lab Results   Component Value Date    CHOL 152 03/23/2016     Lab Results   Component Value Date    HDL 69 03/23/2016     Lab Results   Component Value Date    LDL 51 03/23/2016     Lab Results   Component Value Date    TRIG 159 03/23/2016     Lab Results   Component Value Date    CHOLHDLRATIO 3.7 04/13/2015     Lab Results   Component Value Date    AST 8 12/27/2016     Lab Results   Component Value Date    ALT 31 12/27/2016     Lab Results   Component Value Date    A1C 7.9 02/20/2017    A1C 7.2 10/22/2016    A1C 7.5 09/09/2016    A1C 7.6 03/23/2016    A1C 8.3 02/22/2016     Potassium   Date Value Ref Range Status   12/27/2016 4.4 3.4 - 5.3 mmol/L Final

## 2017-10-02 RX ORDER — GLIPIZIDE 10 MG/1
TABLET ORAL
Qty: 180 TABLET | Refills: 0 | OUTPATIENT
Start: 2017-10-02

## 2017-10-02 NOTE — TELEPHONE ENCOUNTER
Routing refill request to provider for review/approval because:  Labs not current:  BP, A1C    Lizzie Gallegos RN

## 2017-10-11 ENCOUNTER — TELEPHONE (OUTPATIENT)
Dept: FAMILY MEDICINE | Facility: CLINIC | Age: 52
End: 2017-10-11

## 2017-10-11 NOTE — TELEPHONE ENCOUNTER
Panel Management Review      Patient has the following on her problem list:     Diabetes    ASA: Passed    Last A1C  Lab Results   Component Value Date    A1C 7.9 02/20/2017    A1C 7.2 10/22/2016    A1C 7.5 09/09/2016    A1C 7.6 03/23/2016    A1C 8.3 02/22/2016     A1C tested: Passed    Last LDL:    Lab Results   Component Value Date    CHOL 152 03/23/2016     Lab Results   Component Value Date    HDL 69 03/23/2016     Lab Results   Component Value Date    LDL 51 03/23/2016     Lab Results   Component Value Date    TRIG 159 03/23/2016     Lab Results   Component Value Date    CHOLHDLRATIO 3.7 04/13/2015     Lab Results   Component Value Date    NHDL 83 03/23/2016       Is the patient on a Statin? YES             Is the patient on Aspirin? YES    Medications     HMG CoA Reductase Inhibitors    STATIN NOT PRESCRIBED, INTENTIONAL,    Salicylates    ASPIRIN LOW DOSE 81 MG EC tablet          Last three blood pressure readings:  BP Readings from Last 3 Encounters:   03/28/17 118/70   02/20/17 110/68   12/27/16 116/76       Date of last diabetes office visit: 03/28/2017     Tobacco History:     History   Smoking Status     Current Every Day Smoker     Packs/day: 0.75     Types: Cigarettes     Last attempt to quit: 7/15/2013   Smokeless Tobacco     Never Used     Comment: 3/4 to 1 pack daily             Composite cancer screening  Chart review shows that this patient is due/due soon for the following None  Summary:    Patient is due/failing the following:   A1C and LDL    Action needed:   None- patient following up primary care in [place].    Type of outreach:    none- patient following up primary care in [facility]    Questions for provider review:    None                                                                                                                                    Saira Shields MA       Chart routed to none .

## 2017-10-17 DIAGNOSIS — K21.9 GERD (GASTROESOPHAGEAL REFLUX DISEASE): Chronic | ICD-10-CM

## 2017-10-17 NOTE — TELEPHONE ENCOUNTER
omeprazole (PRILOSEC) 20 MG capsule      Last Written Prescription Date: 10/25/16  Last Fill Quantity: 90,  # refills: 3   Last Office Visit with [facility], [facility] or [facility] prescribing provider: 03/28/17      Berta Morales CMA

## 2017-10-31 DIAGNOSIS — E11.65 TYPE 2 DIABETES MELLITUS WITH HYPERGLYCEMIA, WITH LONG-TERM CURRENT USE OF INSULIN (H): Chronic | ICD-10-CM

## 2017-10-31 DIAGNOSIS — Z79.4 TYPE 2 DIABETES MELLITUS WITH HYPERGLYCEMIA, WITH LONG-TERM CURRENT USE OF INSULIN (H): Chronic | ICD-10-CM

## 2017-11-01 NOTE — TELEPHONE ENCOUNTER
Prescription approved per [facility] Refill Protocol.  Patient due for office visit for further refills.  Giuliana Liriano RN - BC

## 2017-12-24 DIAGNOSIS — Z79.4 TYPE 2 DIABETES MELLITUS WITH HYPERGLYCEMIA, WITH LONG-TERM CURRENT USE OF INSULIN (H): Chronic | ICD-10-CM

## 2017-12-24 DIAGNOSIS — E11.65 TYPE 2 DIABETES MELLITUS WITH HYPERGLYCEMIA, WITH LONG-TERM CURRENT USE OF INSULIN (H): Chronic | ICD-10-CM

## 2017-12-26 NOTE — TELEPHONE ENCOUNTER
Pending Prescriptions:                       Disp   Refills    glipiZIDE (GLUCOTROL) 10 MG tablet [Pharm*60 tab*0            Sig: TAKE 1 TABLET(10 MG) BY MOUTH TWICE DAILY BEFORE           MEALS    Routing refill request to provider for review/approval because:  Patient needs to be seen because:  Last office visit 3/28/17, and is overdue for labs.  Labs entered and pending.    Becky Contreras RN

## 2017-12-27 RX ORDER — GLIPIZIDE 10 MG/1
TABLET ORAL
Qty: 60 TABLET | Refills: 0 | OUTPATIENT
Start: 2017-12-27

## 2018-01-23 ENCOUNTER — TELEPHONE (OUTPATIENT)
Dept: FAMILY MEDICINE | Facility: CLINIC | Age: 53
End: 2018-01-23

## 2018-01-23 NOTE — LETTER
January 30, 2018      Kimberly Bills   [address] [id number]   [place] [address]      Dear Kimberly,     Your clinic record indicates that you are due for an Depression update. We have a survey tool called a PHQ-9 (Patient Health Questionnaire) we use to measure how well your Depression is doing. Please complete the enclosed questionnaire and mail it back to us in the self-addressed stamped envelope.     If you have questions about this letter please contact your provider.     Sincerely,    Your [facility]

## 2018-01-23 NOTE — TELEPHONE ENCOUNTER
Panel Management Review      Patient has the following on her problem list:     Diabetes    ASA: Passed    Last A1C  Lab Results   Component Value Date    A1C 7.9 02/20/2017    A1C 7.2 10/22/2016    A1C 7.5 09/09/2016    A1C 7.6 03/23/2016    A1C 8.3 02/22/2016     A1C tested: Passed    Last LDL:    Lab Results   Component Value Date    CHOL 152 03/23/2016     Lab Results   Component Value Date    HDL 69 03/23/2016     Lab Results   Component Value Date    LDL 51 03/23/2016     Lab Results   Component Value Date    TRIG 159 03/23/2016     Lab Results   Component Value Date    CHOLHDLRATIO 3.7 04/13/2015     Lab Results   Component Value Date    NHDL 83 03/23/2016       Is the patient on a Statin? NO             Is the patient on Aspirin? YES    Medications     HMG CoA Reductase Inhibitors    STATIN NOT PRESCRIBED, INTENTIONAL,    Salicylates    ASPIRIN LOW DOSE 81 MG EC tablet          Last three blood pressure readings:  BP Readings from Last 3 Encounters:   03/28/17 118/70   02/20/17 110/68   12/27/16 116/76       Date of last diabetes office visit: 3/28/2017     Tobacco History:     History   Smoking Status     Current Every Day Smoker     Packs/day: 0.75     Types: Cigarettes     Last attempt to quit: 7/15/2013   Smokeless Tobacco     Never Used     Comment: 3/4 to 1 pack daily             Composite cancer screening  Chart review shows that this patient is due/due soon for the following None  Summary:    Patient is due/failing the following:   A1C, LDL and PHQ9    Action needed:   Patient needs office visit for Diabetes check.    Type of outreach:    Phone, left message for patient to call back.     Questions for provider review:    None                                                                                                                                    Mellisa Patel MA        Chart routed to Care Team .

## 2018-01-23 NOTE — LETTER
January 30, 2018          Kimberly Bills,  [address] [address]   [place] [address]        Dear Kimberly Bills      Monitoring and managing your preventative and chronic health conditions are very important to us. Our records indicate that you have not scheduled for Diabetic Check  which was recommended by Dr. Ruiz.      If you have received your health care elsewhere, please call the clinic so the information can be documented in your chart.    Please call [phone] or message us through your [employer] account to schedule an appointment or provide information for your chart.     Feel free to contact us if you have any questions or concerns!    I look forward to seeing you and working with you on your health care needs.     Sincerely,         Jeb Ruiz/ KAREY

## 2018-01-30 NOTE — TELEPHONE ENCOUNTER
No return call from patient. Letter and PHQ9 sent to patient.  -Postponing message for 2 weeks for PHQ9 and will check with patient in 2 weeks.

## 2018-02-14 NOTE — TELEPHONE ENCOUNTER
Called patient but phone is disconnect. Patient is following up primary care in [place].    Saira Shields MA

## 2018-04-09 DIAGNOSIS — E11.65 TYPE 2 DIABETES MELLITUS WITH HYPERGLYCEMIA, WITH LONG-TERM CURRENT USE OF INSULIN (H): Chronic | ICD-10-CM

## 2018-04-09 DIAGNOSIS — Z79.4 TYPE 2 DIABETES MELLITUS WITH HYPERGLYCEMIA, WITH LONG-TERM CURRENT USE OF INSULIN (H): Chronic | ICD-10-CM

## 2018-04-10 RX ORDER — GLIPIZIDE 10 MG/1
TABLET ORAL
Qty: 180 TABLET | Refills: 0 | OUTPATIENT
Start: 2018-04-10

## 2018-04-10 NOTE — TELEPHONE ENCOUNTER
Refused Prescriptions:                       Disp   Refills    glipiZIDE (GLUCOTROL) 10 MG tablet [Pharma*180 ta*0        Sig: TAKE 1 TABLET(10 MG) BY MOUTH TWICE DAILY BEFORE           MEALS  Refused By: HAYLEE GUERRA  Reason for Refusal: Patient no longer under provider care      Reference refill encounter dated 12/24/2017- per provider patient is now following with new provider out of town.     Haylee Guerra RN on 4/10/2018 at 9:51 AM

## 2018-06-23 ENCOUNTER — HEALTH MAINTENANCE LETTER (OUTPATIENT)
Age: 53
End: 2018-06-23

## 2019-12-16 ENCOUNTER — HEALTH MAINTENANCE LETTER (OUTPATIENT)
Age: 54
End: 2019-12-16

## 2021-01-15 ENCOUNTER — HEALTH MAINTENANCE LETTER (OUTPATIENT)
Age: 56
End: 2021-01-15

## 2021-03-21 ENCOUNTER — HEALTH MAINTENANCE LETTER (OUTPATIENT)
Age: 56
End: 2021-03-21

## 2021-09-05 ENCOUNTER — HEALTH MAINTENANCE LETTER (OUTPATIENT)
Age: 56
End: 2021-09-05

## 2022-02-20 ENCOUNTER — HEALTH MAINTENANCE LETTER (OUTPATIENT)
Age: 57
End: 2022-02-20

## 2022-04-17 ENCOUNTER — HEALTH MAINTENANCE LETTER (OUTPATIENT)
Age: 57
End: 2022-04-17

## 2022-10-23 ENCOUNTER — HEALTH MAINTENANCE LETTER (OUTPATIENT)
Age: 57
End: 2022-10-23

## 2023-04-02 ENCOUNTER — HEALTH MAINTENANCE LETTER (OUTPATIENT)
Age: 58
End: 2023-04-02

## 2023-06-01 ENCOUNTER — HEALTH MAINTENANCE LETTER (OUTPATIENT)
Age: 58
End: 2023-06-01

## 2024-01-14 ENCOUNTER — HEALTH MAINTENANCE LETTER (OUTPATIENT)
Age: 59
End: 2024-01-14

## 2025-05-01 ENCOUNTER — TRANSCRIBE ORDERS (OUTPATIENT)
Dept: OTHER | Age: 60
End: 2025-05-01

## 2025-05-01 DIAGNOSIS — R25.1 TREMOR: Primary | ICD-10-CM
